# Patient Record
Sex: MALE | URBAN - METROPOLITAN AREA
[De-identification: names, ages, dates, MRNs, and addresses within clinical notes are randomized per-mention and may not be internally consistent; named-entity substitution may affect disease eponyms.]

---

## 2017-01-30 ENCOUNTER — APPOINTMENT (RX ONLY)
Dept: URBAN - METROPOLITAN AREA CLINIC 109 | Facility: CLINIC | Age: 77
Setting detail: DERMATOLOGY
End: 2017-01-30

## 2017-01-30 DIAGNOSIS — L29.89 OTHER PRURITUS: ICD-10-CM

## 2017-01-30 DIAGNOSIS — L20.89 OTHER ATOPIC DERMATITIS: ICD-10-CM

## 2017-01-30 PROBLEM — Z85.828 PERSONAL HISTORY OF OTHER MALIGNANT NEOPLASM OF SKIN: Status: ACTIVE | Noted: 2017-01-30

## 2017-01-30 PROBLEM — I48.91 UNSPECIFIED ATRIAL FIBRILLATION: Status: ACTIVE | Noted: 2017-01-30

## 2017-01-30 PROBLEM — L30.9 DERMATITIS, UNSPECIFIED: Status: ACTIVE | Noted: 2017-01-30

## 2017-01-30 PROBLEM — L29.8 OTHER PRURITUS: Status: ACTIVE | Noted: 2017-01-30

## 2017-01-30 PROBLEM — L70.0 ACNE VULGARIS: Status: ACTIVE | Noted: 2017-01-30

## 2017-01-30 PROBLEM — H91.90 UNSPECIFIED HEARING LOSS, UNSPECIFIED EAR: Status: ACTIVE | Noted: 2017-01-30

## 2017-01-30 PROBLEM — L57.0 ACTINIC KERATOSIS: Status: ACTIVE | Noted: 2017-01-30

## 2017-01-30 PROBLEM — Z85.820 PERSONAL HISTORY OF MALIGNANT MELANOMA OF SKIN: Status: ACTIVE | Noted: 2017-01-30

## 2017-01-30 PROBLEM — L23.7 ALLERGIC CONTACT DERMATITIS DUE TO PLANTS, EXCEPT FOOD: Status: ACTIVE | Noted: 2017-01-30

## 2017-01-30 PROCEDURE — 99203 OFFICE O/P NEW LOW 30 MIN: CPT

## 2017-01-30 PROCEDURE — ? COUNSELING

## 2017-01-30 PROCEDURE — ? PRESCRIPTION

## 2017-01-30 RX ORDER — TRIAMCINOLONE ACETONIDE 0.25 MG/G
CREAM TOPICAL
Qty: 1 | Refills: 2 | Status: ERX | COMMUNITY
Start: 2017-01-30

## 2017-01-30 RX ADMIN — TRIAMCINOLONE ACETONIDE: 0.25 CREAM TOPICAL at 18:01

## 2017-03-15 ENCOUNTER — APPOINTMENT (RX ONLY)
Dept: URBAN - METROPOLITAN AREA CLINIC 109 | Facility: CLINIC | Age: 77
Setting detail: DERMATOLOGY
End: 2017-03-15

## 2017-03-15 DIAGNOSIS — Z85.828 PERSONAL HISTORY OF OTHER MALIGNANT NEOPLASM OF SKIN: ICD-10-CM

## 2017-03-15 DIAGNOSIS — L82.1 OTHER SEBORRHEIC KERATOSIS: ICD-10-CM

## 2017-03-15 DIAGNOSIS — D22 MELANOCYTIC NEVI: ICD-10-CM

## 2017-03-15 DIAGNOSIS — L57.0 ACTINIC KERATOSIS: ICD-10-CM

## 2017-03-15 DIAGNOSIS — Z86.006 PERSONAL HISTORY OF MELANOMA IN-SITU: ICD-10-CM

## 2017-03-15 DIAGNOSIS — D18.0 HEMANGIOMA: ICD-10-CM

## 2017-03-15 DIAGNOSIS — L57.8 OTHER SKIN CHANGES DUE TO CHRONIC EXPOSURE TO NONIONIZING RADIATION: ICD-10-CM

## 2017-03-15 PROBLEM — D22.9 MELANOCYTIC NEVI, UNSPECIFIED: Status: ACTIVE | Noted: 2017-03-15

## 2017-03-15 PROBLEM — D18.01 HEMANGIOMA OF SKIN AND SUBCUTANEOUS TISSUE: Status: ACTIVE | Noted: 2017-03-15

## 2017-03-15 PROBLEM — Z85.820 PERSONAL HISTORY OF MALIGNANT MELANOMA OF SKIN: Status: ACTIVE | Noted: 2017-03-15

## 2017-03-15 PROCEDURE — 17003 DESTRUCT PREMALG LES 2-14: CPT

## 2017-03-15 PROCEDURE — ? COUNSELING

## 2017-03-15 PROCEDURE — 17000 DESTRUCT PREMALG LESION: CPT

## 2017-03-15 PROCEDURE — 99214 OFFICE O/P EST MOD 30 MIN: CPT | Mod: 25

## 2017-03-15 PROCEDURE — ? LIQUID NITROGEN

## 2017-03-15 ASSESSMENT — LOCATION SIMPLE DESCRIPTION DERM
LOCATION SIMPLE: RIGHT FOREHEAD
LOCATION SIMPLE: RIGHT EAR

## 2017-03-15 ASSESSMENT — LOCATION ZONE DERM
LOCATION ZONE: EAR
LOCATION ZONE: FACE

## 2017-03-15 ASSESSMENT — LOCATION DETAILED DESCRIPTION DERM
LOCATION DETAILED: RIGHT INFERIOR HELIX
LOCATION DETAILED: RIGHT SUPERIOR HELIX
LOCATION DETAILED: RIGHT SUPERIOR FOREHEAD

## 2017-03-15 NOTE — PROCEDURE: LIQUID NITROGEN
Duration Of Freeze Thaw-Cycle (Seconds): 0
Render Post-Care Instructions In Note?: no
Consent: The patient's consent was obtained including but not limited to risks of crusting, scabbing, blistering, scarring, darker or lighter pigmentary change, recurrence, incomplete removal and infection.
Post-Care Instructions: I reviewed with the patient in detail post-care instructions. Patient is to wear sunprotection, and avoid picking at any of the treated lesions. Pt may apply Vaseline to crusted or scabbing areas.
Detail Level: Zone

## 2019-03-13 ENCOUNTER — CLINICAL SUPPORT (OUTPATIENT)
Dept: REHABILITATION | Facility: HOSPITAL | Age: 79
End: 2019-03-13
Payer: MEDICARE

## 2019-03-13 DIAGNOSIS — M62.81 MUSCLE WEAKNESS (GENERALIZED): ICD-10-CM

## 2019-03-13 PROCEDURE — 97161 PT EVAL LOW COMPLEX 20 MIN: CPT

## 2019-03-13 PROCEDURE — 98960 EDU&TRN PT SELF-MGMT NQHP 1: CPT

## 2019-03-13 PROCEDURE — G8981 BODY POS CURRENT STATUS: HCPCS | Mod: CJ

## 2019-03-13 PROCEDURE — G8982 BODY POS GOAL STATUS: HCPCS | Mod: CI

## 2019-03-13 NOTE — PLAN OF CARE
Physical Therapy Evaluation / Plan of Care    Name: Joao Ardon  Clinic Number: [id number]    Diagnosis:   Encounter Diagnosis   Name Primary?    Muscle weakness (generalized)      Physician: Florentin Gutierrez PA  Treatment Orders: PT Eval and Treat    No past medical history on file.  No current outpatient medications on file.     No current facility-administered medications for this visit.      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 male that presents to [facility] secondary to weakness. Joao states he has weakness and fatigue affecting he ADL's and functional mobility. He reports this all started ~1 year ago which ultimately required CABG x3. He is now finished with outpatient cardiac rehab and was seen for PT eval in [place] but was unable to start visits due to traveling to [place]. He currently has no c/o back pain but wants to attend PT for strengthening. Patient has no reported falls in the past 6 months.    Imaging: no available imaging studies available in Epic    Pain: current 0/10, worst 5/10, best 0/10, Aching and Dull, intermittent  Radicular symptoms: no  Aggravating factors: standing and walking  Easing factors: rest, change in position    Pts goals: to be able  "to hit a golf ball again    No cultural, environmental, or spiritual barriers identified to treatment or learning.  Objective   Observation: patient is a well developed, well nourished male in NAD.  Posture:  Forward head,rounded shoulder posture, loss of lumbar lordosis, mild bilateral hip and knee flexion noted in standing  Lumbar Range of Motion:    Degrees Pain   Flexion 40   No change         Extension 15   No change        Left Side Bending 20 No change        Right Side Bending 25 No change        Left rotation   60 No change        Right Rotation   50  No change      AROM L knee -8 degrees ext, 125 degrees flex, R -5 degrees ext, 125 degrees flex  Lower Extremity Strength  Right LE  Left LE    Knee extension: 5/5 Knee extension: 5/5   Knee flexion: 5/5 Knee flexion: 5/5   Hip flexion: 4+/5 Hip flexion: 4+/5   Hip extension:  4/5 Hip extension: 4/5   Hip abduction: 4+/5 Hip abduction: 4+/5   Hip adduction: 5/5 Hip adduction 5/5   Ankle dorsiflexion: 5/5 Ankle dorsiflexion: 5/5   Ankle plantarflexion: 5/5 Ankle plantarflexion: 5/5   Special Tests:  - TU seconds  - 30 second chair rise: 13  - Toe Taps on 6" step without UE support x 8 reps: 8.68 seconds  Neuro Dynamic Testing:    Sciatic nerve:      SLR: R = neg     L = neg  Joint Mobility: decreased segmental mobility in lumbar spine  Palpation: no significant ttp noted  Sensation: gross sensation intact  Flexibility:     90/90 SLR = R moderate restriction, L moderate restriction   Levon's test: R moderate restriction, L moderate restriction              Myles test: R minimal restriction, L minimal restriction  Edema:  Patient has +1 pitting edema in bilateral lower legs and ankles, L > R  Functional Limitations Reports - G Codes  Mod Oswestry 16% impairment  LEFS: 21.25% LE impairment    PT Evaluation Completed: Yes  Discussed Plan of Care with patient: Yes  TREATMENT:    Home Exercises and Patient Education Provided  Education provided re: postural " care/correction, frequent changes in position throughout the day, resting positions to decrease pain  - importance of HEP in carry over between treatment sessions and for best rehab outcome  - educated on condition and expectations for therapy  - progress towards goals   - role of therapy in multi - disciplinary team, goals for therapy  No spiritual or educational barriers to learning provided    Home exercises:  Pt will be provided HEP during course of treatment with progressions as appropriate. Pt was advised to perform these exercises free of pain, and to stop performing them if pain occurs.   Joao demonstrated good  understanding of the education provided.     Assessment   Joao is an active 79 year old male referred to outpatient physical therapy and presents to PT with weakness and low back pain . Patient demonstrates limitations as described in the problem list. Pt will benefit from physcial therapy services in order to maximize pain free and/or functional use of trunk and bilateral LE's. The following goals were discussed with the patient and patient is in agreement with them as to be addressed in the treatment plan.   Pt prognosis is Good.   Pt will benefit from skilled outpatient Physical Therapy to address the deficits stated above and in the chart below, provide pt/family education, and to maximize pt's level of independence.     Anticipated barriers to physical therapy: none identified    Medical necessity is demonstrated by the following IMPAIRMENTS/PROBLEM LIST:  weakness, impaired endurance, impaired functional mobility and impaired balance    GOALS:   Long Term Goals: 6 weeks    1. Decrease pain to < / = 1/10 with activity to allow for independent ADL's and resumption of recreational activities.  2. Improve strength in core and LE muscles to 5/5 for improved lumbopelvic stability.  3. Improve lumbar and bilateral hip/knee ROM to to demonstrate improved posture and flexibility.   4. Improve score on  LEFS to < / = 10% impairment / Mod Oswestry to < / = 4% impairment to demonstrate improved function and decrease disability.  5. Increase walking distance and/or duration to 1 mile without pain.   6. Increase standing duration to 25 minutes without pain.   7. Perform sit to stand transfers without increased pain or limitation.  8. Pt to demonstrate independence with home exercise program to maintain gains made in therapy.      Plan   Pt will be treated by physical therapy 1-2 times a week for 6 weeks for Pt Education, HEP, therapeutic exercises, neuromuscular re-education, joint mobilizations, modalities prn to achieve established goals. Joao may at times be seen by a PTA as part of the Rehab Team.   Cont PT for 6 weeks.     Maxi Hernández, PT    I certify the need for these services furnished under this plan of treatment and while under my care.______________________________ Physician/Referring Practitioner  Date of Signature

## 2019-03-18 ENCOUNTER — CLINICAL SUPPORT (OUTPATIENT)
Dept: REHABILITATION | Facility: HOSPITAL | Age: 79
End: 2019-03-18
Payer: MEDICARE

## 2019-03-18 DIAGNOSIS — M62.81 MUSCLE WEAKNESS: Primary | ICD-10-CM

## 2019-03-18 PROCEDURE — 97110 THERAPEUTIC EXERCISES: CPT

## 2019-03-18 NOTE — PROGRESS NOTES
"                            Physical Therapy Daily Treatment Note   Name: Ag Ardon 1940  MRN: [id number]    Visit Date: 3/18/2019  Visit #: 2 / 12  Authorization period Expiration: 12/31/19    Plan of Care Expiration: 06/08/19  Precautions: standard    Time In: 9:00  Time Out: 10:00  Total 1:1 Treatment Time: 45 min    Treatment Diagnosis: Low back pain  Physician: Florentin Gutierrez PA    Subjective   Pt reports: He fasted over the weekend and lost 7 pounds. He has no reported low back pain today.     Pain Scale:  0/10 on VAS currently, 0/10 on VAS post treatment  Pain Location: no c/o pain today    Objective   AG received therapeutic exercises to develop strength, endurance, ROM, flexibility, posture and core stabilization for 45 minutes including:  Nustep Lv 1 x 15 minutes  Quad sets in sl flex with 5" H x 10  Quad sets with 5" H x 10  SLR x 10  Pelvic tilt x 10  HS stretch with strap with 20" H x 5  SKC x 5  Bridging with ball x 10  Supine clams with RTB x 10  Standing B heel raises x 15  Standing hip abd x 15  Alt toe taps on 6" step x 15    Home Exercises and Education Provided     Home Exercises and Patient Education Provided  Education provided re: postural care/correction, frequent changes in position throughout the day, resting positions to decrease pain  - importance of HEP in carry over between treatment sessions and for best rehab outcome  - educated on condition and expectations for therapy  - progress towards goals   - role of therapy in multi - disciplinary team, goals for therapy  No spiritual or educational barriers to learning provided     Home exercises:  Pt will be provided HEP during course of treatment with progressions as appropriate. Pt was advised to perform these exercises free of pain, and to stop performing them if pain occurs.   Ag demonstrated good  understanding of the education provided.     Assessment   AG completed above exercise without complication or increase in " pain reported. Verbal cueing required for postural correction and exercise technique. Will progress with strengthening and flexibility program for spine and LE's as well as balance and gait training as tolerated.    Pt prognosis is Good. Pt will continue to benefit from skilled outpatient physical therapy to address the deficits listed in the problem list chart on initial evaluation, provide pt/family education and to maximize pt's level of independence in the home and community environment.     Medical necessity is demonstrated by the impairments and functional limitations listed on the Initial Evaluation.     Anticipated barriers to physical therapy: none identified  Pt's spiritual, cultural and educational needs considered and pt agreeable to plan of care and goals.    Goals   Long Term Goals: 6 weeks     1. Decrease pain to < / = 1/10 with activity to allow for independent ADL's and resumption of recreational activities.  2. Improve strength in core and LE muscles to 5/5 for improved lumbopelvic stability.  3. Improve lumbar and bilateral hip/knee ROM to to demonstrate improved posture and flexibility.   4. Improve score on LEFS to < / = 10% impairment / Mod Oswestry to < / = 4% impairment to demonstrate improved function and decrease disability.  5. Increase walking distance and/or duration to 1 mile without pain.   6. Increase standing duration to 25 minutes without pain.   7. Perform sit to stand transfers without increased pain or limitation.  8. Pt to demonstrate independence with home exercise program to maintain gains made in therapy.      Plan   Continue with established Plan of Care towards Physical Therapy goals.   Discussed Plan of Care with patient: Yes    Maxi Hernández, PT

## 2019-03-19 PROBLEM — M62.81 MUSCLE WEAKNESS (GENERALIZED): Status: ACTIVE | Noted: 2019-03-19

## 2019-03-20 ENCOUNTER — CLINICAL SUPPORT (OUTPATIENT)
Dept: REHABILITATION | Facility: HOSPITAL | Age: 79
End: 2019-03-20
Payer: MEDICARE

## 2019-03-20 DIAGNOSIS — M62.81 MUSCLE WEAKNESS (GENERALIZED): Primary | ICD-10-CM

## 2019-03-20 PROCEDURE — 97110 THERAPEUTIC EXERCISES: CPT

## 2019-03-20 NOTE — PROGRESS NOTES
"                            Physical Therapy Daily Treatment Note   Name: Joao Ardon 1940  MRN: [id number]    Visit Date: 3/20/2019  Visit #: 3 / 12  Authorization period Expiration: 12/31/19    Plan of Care Expiration: 06/08/19  Precautions: standard    Time In: 9:00  Time Out: 10:00  Total 1:1 Treatment Time: 50 min    Treatment Diagnosis: Low back pain  Physician: Florentin Gutierrez PA    Subjective   Pt reports: He is doing well today, no specific c/o pain.     Pain Scale:  0/10 on VAS currently, 0/10 on VAS post treatment  Pain Location: no c/o pain today    Objective   JACKSON received therapeutic exercises to develop strength, endurance, ROM, flexibility, posture and core stabilization for 50 minutes including:  Nustep Lv 3 x 15 minutes  Quad sets in sl flex with 5" H x 10  Quad sets with 5" H x 10  SLR x 10  Pelvic tilt x 10  Supine LTR x 10  DKC with PB x 10  HS stretch with strap with 20" H x 5  SKC x 5  Bridging with ball x 10  Supine clams with RTB x 10  Standing B heel raises x 15  Standing hip abd x 15 DNP  Alt toe taps on 6" step x 15  Standing bilateral rows with RTB x 10  Standing bilateral shoulder ext with RTB x 10  SLS with 1 UE assist as needed x 5 each leg (CGA for safety)    Home Exercises and Education Provided   Home Exercises and Patient Education Provided  Education provided re: postural care/correction, frequent changes in position throughout the day, resting positions to decrease pain  - importance of HEP in carry over between treatment sessions and for best rehab outcome  - educated on condition and expectations for therapy  - progress towards goals   - role of therapy in multi - disciplinary team, goals for therapy  No spiritual or educational barriers to learning provided     Home exercises: issued RTB   Pt will be provided HEP during course of treatment with progressions as appropriate. Pt was advised to perform these exercises free of pain, and to stop performing them if pain " kan.   Joao demonstrated good  understanding of the education provided.      Assessment   JACKSON completed above exercise without complication or increase in pain reported. Patient demonstrating antalgic gait pattern with increased base of support, sl flexion noted in trunk/hips/knees with compensation via bilateral shoulder extension and scapular retraction, and increase ER at hips. Will progress with strengthening and flexibility program for spine and LE's as well as balance and gait training as tolerated. Jackson and very motivated and is progressing well towards his goals with no updates to goals at this time.     Pt prognosis is Good. Pt will continue to benefit from skilled outpatient physical therapy to address the deficits listed in the problem list chart on initial evaluation, provide pt/family education and to maximize pt's level of independence in the home and community environment.     Medical necessity is demonstrated by the impairments and functional limitations listed on the Initial Evaluation.     Anticipated barriers to physical therapy: none identified  Pt's spiritual, cultural and educational needs considered and pt agreeable to plan of care and goals.    Goals   Long Term Goals: 6 weeks     1. Decrease pain to < / = 1/10 with activity to allow for independent ADL's and resumption of recreational activities.  2. Improve strength in core and LE muscles to 5/5 for improved lumbopelvic stability.  3. Improve lumbar and bilateral hip/knee ROM to to demonstrate improved posture and flexibility.   4. Improve score on LEFS to < / = 10% impairment / Mod Oswestry to < / = 4% impairment to demonstrate improved function and decrease disability.  5. Increase walking distance and/or duration to 1 mile without pain.   6. Increase standing duration to 25 minutes without pain.   7. Perform sit to stand transfers without increased pain or limitation.  8. Pt to demonstrate independence with home exercise program to  maintain gains made in therapy.      Plan   Continue with established Plan of Care towards Physical Therapy goals.   Discussed Plan of Care with patient: Yes    Maxi Hernández, PT

## 2019-03-25 ENCOUNTER — CLINICAL SUPPORT (OUTPATIENT)
Dept: REHABILITATION | Facility: HOSPITAL | Age: 79
End: 2019-03-25
Payer: MEDICARE

## 2019-03-25 DIAGNOSIS — M62.81 MUSCLE WEAKNESS (GENERALIZED): ICD-10-CM

## 2019-03-25 PROCEDURE — 97110 THERAPEUTIC EXERCISES: CPT

## 2019-03-25 PROCEDURE — 97530 THERAPEUTIC ACTIVITIES: CPT

## 2019-03-25 NOTE — PROGRESS NOTES
"                            Physical Therapy Daily Treatment Note   Name: Joao Ardon 1940  MRN: [id number]    Visit Date: 3/25/2019  Visit #: 4 / 12  Authorization period Expiration: 12/31/19    Plan of Care Expiration: 06/08/19  Precautions: standard    Time In: 9:00  Time Out: 10:00  Total 1:1 Treatment Time: 60 min    Treatment Diagnosis: Low back pain  Physician: Florentin Gutierrez PA    Subjective   Pt reports: no pain at the moment, when I play golf I can tell my back is weak and my core is weak. No complaints just want to get stronger.      Pain Scale:  0/10 on VAS currently, 0/10 on VAS post treatment  Pain Location: no c/o pain today    Objective   JACKSON received therapeutic exercises to develop strength, endurance, ROM, flexibility, posture and core stabilization for 50 minutes including:  Nustep Lv 3 x 15 minutes  Quad sets in sl flex with 5" H x 10  Quad sets with 5" H x 10  SLR 2 x 10 1#  Pelvic tilt x 10  Supine LTR x 15  DKC with PB x 15  HS stretch with strap with 20" H x 5  Bridging with ball x 15  Supine clams with RTB 2 x 10  Standing B heel raises x 20  Standing hip abd x 15 DNP  Alt toe taps on 6" step 2 x 10  Standing bilateral rows with BTB 2 x 10  Standing bilateral shoulder ext with BTB 2 x 10  Seated on ball resisted trunk rotation: 2 plates x 10  Mat squats: 2 x 10  LAQ: 3# 2 x 10  Step ups: fwd/lat x 10 ea 6" step  Prone hip ext: 2 x 10 ea  hooklying crunches: x15    Home Exercises and Education Provided   Home Exercises and Patient Education Provided  Education provided re: postural care/correction, frequent changes in position throughout the day, resting positions to decrease pain  - importance of HEP in carry over between treatment sessions and for best rehab outcome  - educated on condition and expectations for therapy  - progress towards goals   - role of therapy in multi - disciplinary team, goals for therapy  No spiritual or educational barriers to learning provided     Home " exercises: issued RTB   Pt will be provided HEP during course of treatment with progressions as appropriate. Pt was advised to perform these exercises free of pain, and to stop performing them if pain occurs.   Joao demonstrated good  understanding of the education provided.      Assessment   JACKSON completed above exercise without complication or increase in pain reported and able to progress with core/LE/lumbar strengthening and stabilization exercise. Pt reports moderate fatigue post treatment however demonstrates increased activity tolerance. Continue to progress per pt tolerance, no pain upon departure.     Pt prognosis is Good. Pt will continue to benefit from skilled outpatient physical therapy to address the deficits listed in the problem list chart on initial evaluation, provide pt/family education and to maximize pt's level of independence in the home and community environment.     Medical necessity is demonstrated by the impairments and functional limitations listed on the Initial Evaluation.     Anticipated barriers to physical therapy: none identified  Pt's spiritual, cultural and educational needs considered and pt agreeable to plan of care and goals.    Goals   Long Term Goals: 6 weeks     1. Decrease pain to < / = 1/10 with activity to allow for independent ADL's and resumption of recreational activities.  2. Improve strength in core and LE muscles to 5/5 for improved lumbopelvic stability.  3. Improve lumbar and bilateral hip/knee ROM to to demonstrate improved posture and flexibility.   4. Improve score on LEFS to < / = 10% impairment / Mod Oswestry to < / = 4% impairment to demonstrate improved function and decrease disability.  5. Increase walking distance and/or duration to 1 mile without pain.   6. Increase standing duration to 25 minutes without pain.   7. Perform sit to stand transfers without increased pain or limitation.  8. Pt to demonstrate independence with home exercise program to  maintain gains made in therapy.      Plan   Continue with established Plan of Care towards Physical Therapy goals.   Discussed Plan of Care with patient: Yes    Stna Kohli, PTA

## 2019-03-27 ENCOUNTER — CLINICAL SUPPORT (OUTPATIENT)
Dept: REHABILITATION | Facility: HOSPITAL | Age: 79
End: 2019-03-27
Payer: MEDICARE

## 2019-03-27 DIAGNOSIS — M62.81 MUSCLE WEAKNESS (GENERALIZED): ICD-10-CM

## 2019-03-27 PROCEDURE — 97530 THERAPEUTIC ACTIVITIES: CPT

## 2019-03-27 PROCEDURE — 97110 THERAPEUTIC EXERCISES: CPT

## 2019-03-27 NOTE — PROGRESS NOTES
"                            Physical Therapy Daily Treatment Note   Name: Joao Ardon 1940  MRN: [id number]    Visit Date: 3/27/2019  Visit #: 5 / 12  Authorization period Expiration: 12/31/19    Plan of Care Expiration: 06/08/19  Precautions: standard    Time In: 9:00  Time Out: 10:00  Total 1:1 Treatment Time: 60 min    Treatment Diagnosis: Low back pain  Physician: Florentin Gutierrez PA    Subjective   Pt reports: been pain free, i'm doing fine, last session felt good.      Pain Scale:  0/10 on VAS currently, 0/10 on VAS post treatment  Pain Location: no c/o pain today    Objective   JACKSON received therapeutic exercises to develop strength, endurance, ROM, flexibility, posture and core stabilization for 50 minutes including:  Nustep Lv 3 x 15 minutes  Quad sets in sl flex with 5" H x 10  Quad sets with 5" H x 10  SLR 2 x 10 1#  Pelvic tilt x 10  Supine LTR 2 x 10  DKC with PB 2 x 10  HS stretch with strap with 20" H x 5  Bridging with ball 2 x 10  Supine clams with RTB 2 x 10  Standing B heel raises x 20  Standing hip abd x 15 DNP  Alt toe taps on 6" step 2 x 10  Standing bilateral rows with BTB 2 x 10  Standing bilateral shoulder ext with BTB 2 x 10  Seated on chair resisted trunk rotation: 2 plates 3 x 10  Mat squats: 2 x 10  LAQ: 3# 2 x 10  Step ups: fwd/lat x 10 ea 6" step  Prone hip ext: 2 x 10 ea  hooklying crunches: 2x15    Home Exercises and Education Provided   Home Exercises and Patient Education Provided  Education provided re: postural care/correction, frequent changes in position throughout the day, resting positions to decrease pain  - importance of HEP in carry over between treatment sessions and for best rehab outcome  - educated on condition and expectations for therapy  - progress towards goals   - role of therapy in multi - disciplinary team, goals for therapy  No spiritual or educational barriers to learning provided     Home exercises: issued RTB   Pt will be provided HEP during course of " treatment with progressions as appropriate. Pt was advised to perform these exercises free of pain, and to stop performing them if pain occurs.   Joao demonstrated good  understanding of the education provided.      Assessment   JACKSON completed above exercise without complication or increase in pain reported and able to progress with core/LE/lumbar strengthening and stabilization exercise by increasing reps with therex. Pt reports moderate fatigue post treatment however demonstrates increased activity tolerance. Continue to progress per pt tolerance, no pain upon departure.     Pt prognosis is Good. Pt will continue to benefit from skilled outpatient physical therapy to address the deficits listed in the problem list chart on initial evaluation, provide pt/family education and to maximize pt's level of independence in the home and community environment.     Medical necessity is demonstrated by the impairments and functional limitations listed on the Initial Evaluation.     Anticipated barriers to physical therapy: none identified  Pt's spiritual, cultural and educational needs considered and pt agreeable to plan of care and goals.    Goals   Long Term Goals: 6 weeks     1. Decrease pain to < / = 1/10 with activity to allow for independent ADL's and resumption of recreational activities.  2. Improve strength in core and LE muscles to 5/5 for improved lumbopelvic stability.  3. Improve lumbar and bilateral hip/knee ROM to to demonstrate improved posture and flexibility.   4. Improve score on LEFS to < / = 10% impairment / Mod Oswestry to < / = 4% impairment to demonstrate improved function and decrease disability.  5. Increase walking distance and/or duration to 1 mile without pain.   6. Increase standing duration to 25 minutes without pain.   7. Perform sit to stand transfers without increased pain or limitation.  8. Pt to demonstrate independence with home exercise program to maintain gains made in therapy.      Plan    Continue with established Plan of Care towards Physical Therapy goals.   Discussed Plan of Care with patient: Yes    Stan Kohli, PTA

## 2019-04-01 ENCOUNTER — CLINICAL SUPPORT (OUTPATIENT)
Dept: REHABILITATION | Facility: HOSPITAL | Age: 79
End: 2019-04-01
Payer: MEDICARE

## 2019-04-01 DIAGNOSIS — M62.81 MUSCLE WEAKNESS (GENERALIZED): ICD-10-CM

## 2019-04-01 PROCEDURE — 97110 THERAPEUTIC EXERCISES: CPT

## 2019-04-04 ENCOUNTER — LAB VISIT (OUTPATIENT)
Dept: LAB | Facility: HOSPITAL | Age: 79
End: 2019-04-04
Attending: INTERNAL MEDICINE
Payer: MEDICARE

## 2019-04-04 ENCOUNTER — CLINICAL SUPPORT (OUTPATIENT)
Dept: REHABILITATION | Facility: HOSPITAL | Age: 79
End: 2019-04-04
Payer: MEDICARE

## 2019-04-04 DIAGNOSIS — I48.20 CHRONIC ATRIAL FIBRILLATION: Primary | ICD-10-CM

## 2019-04-04 DIAGNOSIS — M62.81 MUSCLE WEAKNESS (GENERALIZED): ICD-10-CM

## 2019-04-04 LAB
INR PPP: 3.1 (ref 0.8–1.2)
PROTHROMBIN TIME: 34.6 SEC (ref 9–12.5)

## 2019-04-04 PROCEDURE — 97530 THERAPEUTIC ACTIVITIES: CPT

## 2019-04-04 PROCEDURE — 36415 COLL VENOUS BLD VENIPUNCTURE: CPT

## 2019-04-04 PROCEDURE — 97110 THERAPEUTIC EXERCISES: CPT

## 2019-04-04 PROCEDURE — 85610 PROTHROMBIN TIME: CPT

## 2019-04-04 PROCEDURE — 97140 MANUAL THERAPY 1/> REGIONS: CPT

## 2019-04-04 NOTE — PROGRESS NOTES
"                            Physical Therapy Daily Treatment Note   Name: Joao Ardon 1940  MRN: [id number]    Visit Date: 4/4/2019  Visit #: 7 / 12  Authorization period Expiration: 12/31/19    Plan of Care Expiration: 06/08/19  Precautions: standard    Time In: 9:00  Time Out: 10:10  Total 1:1 Treatment Time: 60 min    Treatment Diagnosis: Low back pain  Physician: Florentin Gutierrez PA    Subjective   Pt reports: no pain. Continued stiffness in low back.      Pain Scale:  0/10 on VAS currently, 0/10 on VAS post treatment  Pain Location: no c/o pain today    Objective   JACKSON received therapeutic exercises (40 min), therapeutic activities (10 min)  to develop strength, endurance, ROM, flexibility, posture and core stabilization including:  Nustep Lv 3 x 15 minutes  Pelvic tilt x 5 March with abdominal bracing x 10 ea  Supine LTR with SB 2 x 10  HS stretch in long sitting 1 min ea  Bridging with ball 2 x 10  Supine clams with GTB 2 x 10  Standing bilateral rows with BTB 2 x 10  Standing bilateral shoulder ext with BTB 2 x 10  Seated on chair resisted trunk rotation: 3 plates 2x15  Mat squats: 2 x 10  Step ups: fwd/lat x 15 ea 6" step  Prone hip ext: 2 x 10 ea  hooklying crunches: 2x15  Chop and lift in standing: x15 ea/ Blue TB    Jackson received the following manual therapy techniques for 10 minutes including:  Lumbar rotational mobs in s/l bilaterally gr II       Home Exercises and Education Provided   Home Exercises and Patient Education Provided  Education provided re: postural care/correction, frequent changes in position throughout the day, resting positions to decrease pain  - importance of HEP in carry over between treatment sessions and for best rehab outcome  - educated on condition and expectations for therapy  - progress towards goals   - role of therapy in multi - disciplinary team, goals for therapy  No spiritual or educational barriers to learning provided     Home exercises:   Pt will be provided " HEP during course of treatment with progressions as appropriate. Pt was advised to perform these exercises free of pain, and to stop performing them if pain occurs.   Joao demonstrated good  understanding of the education provided.      Assessment   JACKSON completed above exercise without complication or increase in pain reported and able to progress with core/LE/lumbar strengthening and stabilization exercise by increasing reps with therex. Patient with severe restrictions in hamstring, quad, rectus femoris length bilaterally affecting posture. Pt quick to get winded due to low CV endurance, requiring occasional rest breaks. Pt demonstrating core strength deficits with difficulty activating and maintaining lower abdominal contraction.       Pt prognosis is Good. Pt will continue to benefit from skilled outpatient physical therapy to address the deficits listed in the problem list chart on initial evaluation, provide pt/family education and to maximize pt's level of independence in the home and community environment.     Medical necessity is demonstrated by the impairments and functional limitations listed on the Initial Evaluation.     Anticipated barriers to physical therapy: none identified  Pt's spiritual, cultural and educational needs considered and pt agreeable to plan of care and goals.    Goals   Long Term Goals: 6 weeks     1. Decrease pain to < / = 1/10 with activity to allow for independent ADL's and resumption of recreational activities.  2. Improve strength in core and LE muscles to 5/5 for improved lumbopelvic stability.  3. Improve lumbar and bilateral hip/knee ROM to to demonstrate improved posture and flexibility.   4. Improve score on LEFS to < / = 10% impairment / Mod Oswestry to < / = 4% impairment to demonstrate improved function and decrease disability.  5. Increase walking distance and/or duration to 1 mile without pain.   6. Increase standing duration to 25 minutes without pain.   7. Perform  sit to stand transfers without increased pain or limitation.  8. Pt to demonstrate independence with home exercise program to maintain gains made in therapy.      Plan   Continue with established Plan of Care towards Physical Therapy goals.   Discussed Plan of Care with patient: Yes    Brenda Patel, PT

## 2019-04-04 NOTE — PROGRESS NOTES
Supervisory visit with Stan Kohli PTA.  May proceed with goals and treatments as outlined in POC.

## 2019-04-08 ENCOUNTER — CLINICAL SUPPORT (OUTPATIENT)
Dept: REHABILITATION | Facility: HOSPITAL | Age: 79
End: 2019-04-08
Payer: MEDICARE

## 2019-04-08 DIAGNOSIS — M62.81 MUSCLE WEAKNESS (GENERALIZED): ICD-10-CM

## 2019-04-08 PROCEDURE — 97110 THERAPEUTIC EXERCISES: CPT

## 2019-04-08 NOTE — PROGRESS NOTES
"                            Physical Therapy Daily Treatment Note   Name: Joao Ardon 1940  MRN: [id number]    Visit Date: 4/8/2019  Visit #: 8 / 12  Authorization period Expiration: 12/31/19    Plan of Care Expiration: 06/08/19  Precautions: standard    Time In: 9:00  Time Out: 10:10  Total 1:1 Treatment Time: 60 min    Treatment Diagnosis: Low back pain  Physician: Florentin Gutierrez PA    Subjective   Pt reports: no pain, just tired because it's Monday.      Pain Scale:  0/10 on VAS currently, 0/10 on VAS post treatment  Pain Location: no c/o pain today    Objective   JACKSON received therapeutic exercises (40 min), therapeutic activities (10 min)  to develop strength, endurance, ROM, flexibility, posture and core stabilization including:  Nustep Lv 3 x 15 minutes  Pelvic tilt x 5 March with abdominal bracing x 10 ea  Supine LTR with SB 2 x 10  HS stretch in long sitting 1 min ea  Bridging with ball 2 x 10  Supine clams with GTB 2 x 10  Standing bilateral rows with BTB 2 x 10  Standing bilateral shoulder ext with BTB 2 x 10  Seated on chair resisted trunk rotation: 3 plates 2x15  Mat squats: 2 x 15  Step ups: fwd/lat x 15 ea 6" step  Prone hip ext: 2 x 10 ea  hooklying crunches: 2x15  Chop and lift in standing: x15 ea/ Blue TB DNP    Jackson received the following manual therapy techniques for 10 minutes including:  Lumbar rotational mobs in s/l bilaterally gr II DNP      Home Exercises and Education Provided   Home Exercises and Patient Education Provided  Education provided re: postural care/correction, frequent changes in position throughout the day, resting positions to decrease pain  - importance of HEP in carry over between treatment sessions and for best rehab outcome  - educated on condition and expectations for therapy  - progress towards goals   - role of therapy in multi - disciplinary team, goals for therapy  No spiritual or educational barriers to learning provided     Home exercises:   Pt will be " provided HEP during course of treatment with progressions as appropriate. Pt was advised to perform these exercises free of pain, and to stop performing them if pain occurs.   Joao demonstrated good  understanding of the education provided.      Assessment   JACKSON was able to complete core strengthening/stabilization/mobility therex today, continues to present with hamstring and hip flexor tightness. No complications or pain reported with treatment session, continue to progress as tolerated. Pain free upon departure.       Pt prognosis is Good. Pt will continue to benefit from skilled outpatient physical therapy to address the deficits listed in the problem list chart on initial evaluation, provide pt/family education and to maximize pt's level of independence in the home and community environment.     Medical necessity is demonstrated by the impairments and functional limitations listed on the Initial Evaluation.     Anticipated barriers to physical therapy: none identified  Pt's spiritual, cultural and educational needs considered and pt agreeable to plan of care and goals.    Goals   Long Term Goals: 6 weeks     1. Decrease pain to < / = 1/10 with activity to allow for independent ADL's and resumption of recreational activities.  2. Improve strength in core and LE muscles to 5/5 for improved lumbopelvic stability.  3. Improve lumbar and bilateral hip/knee ROM to to demonstrate improved posture and flexibility.   4. Improve score on LEFS to < / = 10% impairment / Mod Oswestry to < / = 4% impairment to demonstrate improved function and decrease disability.  5. Increase walking distance and/or duration to 1 mile without pain.   6. Increase standing duration to 25 minutes without pain.   7. Perform sit to stand transfers without increased pain or limitation.  8. Pt to demonstrate independence with home exercise program to maintain gains made in therapy.      Plan   Continue with established Plan of Care towards  Physical Therapy goals.   Discussed Plan of Care with patient: Yes    Stan Kohli, PTA

## 2019-04-12 ENCOUNTER — LAB VISIT (OUTPATIENT)
Dept: LAB | Facility: HOSPITAL | Age: 79
End: 2019-04-12
Attending: INTERNAL MEDICINE
Payer: MEDICARE

## 2019-04-12 DIAGNOSIS — I48.20 CHRONIC ATRIAL FIBRILLATION: Primary | ICD-10-CM

## 2019-04-12 LAB
INR PPP: 2.1 (ref 0.8–1.2)
PROTHROMBIN TIME: 23.2 SEC (ref 9–12.5)

## 2019-04-12 PROCEDURE — 36415 COLL VENOUS BLD VENIPUNCTURE: CPT

## 2019-04-12 PROCEDURE — 85610 PROTHROMBIN TIME: CPT

## 2019-04-15 ENCOUNTER — CLINICAL SUPPORT (OUTPATIENT)
Dept: REHABILITATION | Facility: HOSPITAL | Age: 79
End: 2019-04-15
Payer: MEDICARE

## 2019-04-15 DIAGNOSIS — M62.81 MUSCLE WEAKNESS (GENERALIZED): ICD-10-CM

## 2019-04-15 PROCEDURE — 97110 THERAPEUTIC EXERCISES: CPT

## 2019-04-15 NOTE — PROGRESS NOTES
"                            Physical Therapy Daily Treatment Note   Name: Joao Ardon 1940  MRN: [id number]    Visit Date: 4/15/2019  Visit #: 9 / 12  Authorization period Expiration: 12/31/19    Plan of Care Expiration: 06/08/19  Precautions: standard    Time In: 9:00  Time Out: 10:10  Total 1:1 Treatment Time: 60 min    Treatment Diagnosis: Low back pain  Physician: Florentin Gutierrez PA    Subjective   Pt reports: a little stove up but no pain, just trying to get moving.      Pain Scale:  0/10 on VAS currently, 0/10 on VAS post treatment  Pain Location: no c/o pain today    Objective   JACKSON received therapeutic exercises (40 min), therapeutic activities (10 min)  to develop strength, endurance, ROM, flexibility, posture and core stabilization including:  Nustep Lv 3 x 15 minutes  Pelvic tilt x 5 March with abdominal bracing x 10 ea  Supine LTR with SB 2 x 10  HS stretch in long sitting 1 min ea  Bridging with ball 2 x 10   Supine clams with GTB 2 x 10  Standing bilateral rows with BTB 2 x 10  Standing bilateral shoulder ext with BTB 2 x 10  Seated on chair resisted trunk rotation: 3 plates 2x15  Mat squats: 2 x 15  Step ups: fwd/lat x 15 ea 6" step  Prone hip ext: 2 x 10 ea  hooklying crunches: 2x15  Chop and lift in standing: x15 ea/ Blue TB DNP    Jackson received the following manual therapy techniques for 10 minutes including:  Lumbar rotational mobs in s/l bilaterally gr II DNP      Home Exercises and Education Provided   Home Exercises and Patient Education Provided  Education provided re: postural care/correction, frequent changes in position throughout the day, resting positions to decrease pain  - importance of HEP in carry over between treatment sessions and for best rehab outcome  - educated on condition and expectations for therapy  - progress towards goals   - role of therapy in multi - disciplinary team, goals for therapy  No spiritual or educational barriers to learning provided     Home exercises: "   Pt will be provided HEP during course of treatment with progressions as appropriate. Pt was advised to perform these exercises free of pain, and to stop performing them if pain occurs.   Joao demonstrated good  understanding of the education provided.      Assessment   JACKSON was able to complete core strengthening/stabilization/mobility therex today, continues to present with hamstring and hip flexor tightness. No complications or pain reported with treatment session, continue to progress as tolerated. Pain free upon departure.       Pt prognosis is Good. Pt will continue to benefit from skilled outpatient physical therapy to address the deficits listed in the problem list chart on initial evaluation, provide pt/family education and to maximize pt's level of independence in the home and community environment.     Medical necessity is demonstrated by the impairments and functional limitations listed on the Initial Evaluation.     Anticipated barriers to physical therapy: none identified  Pt's spiritual, cultural and educational needs considered and pt agreeable to plan of care and goals.    Goals   Long Term Goals: 6 weeks     1. Decrease pain to < / = 1/10 with activity to allow for independent ADL's and resumption of recreational activities.  2. Improve strength in core and LE muscles to 5/5 for improved lumbopelvic stability.  3. Improve lumbar and bilateral hip/knee ROM to to demonstrate improved posture and flexibility.   4. Improve score on LEFS to < / = 10% impairment / Mod Oswestry to < / = 4% impairment to demonstrate improved function and decrease disability.  5. Increase walking distance and/or duration to 1 mile without pain.   6. Increase standing duration to 25 minutes without pain.   7. Perform sit to stand transfers without increased pain or limitation.  8. Pt to demonstrate independence with home exercise program to maintain gains made in therapy.      Plan   Continue with established Plan of Care  towards Physical Therapy goals.   Discussed Plan of Care with patient: Yes    Stan Kohli, PTA

## 2019-04-17 ENCOUNTER — CLINICAL SUPPORT (OUTPATIENT)
Dept: REHABILITATION | Facility: HOSPITAL | Age: 79
End: 2019-04-17
Payer: MEDICARE

## 2019-04-17 DIAGNOSIS — M62.81 MUSCLE WEAKNESS (GENERALIZED): ICD-10-CM

## 2019-04-17 PROCEDURE — 97110 THERAPEUTIC EXERCISES: CPT

## 2019-04-17 NOTE — PROGRESS NOTES
"                            Physical Therapy Daily Treatment Note   Name: Joao Ardon 1940  MRN: [id number]    Visit Date: 4/17/2019  Visit #: 9 / 12  Authorization period Expiration: 12/31/19    Plan of Care Expiration: 06/08/19  Precautions: standard    Time In: 9:00  Time Out: 9:34  Total 1:1 Treatment Time: 30min    Treatment Diagnosis: Low back pain  Physician: Florentin Gutierrez PA    Subjective   Pt reports: have to cut treatment short today because we are getting inspections done on a house we are buying today, no pain though I feel good      Pain Scale:  0/10 on VAS currently, 0/10 on VAS post treatment  Pain Location: no c/o pain today    Objective   JACKSON received therapeutic exercises (40 min), therapeutic activities (10 min)  to develop strength, endurance, ROM, flexibility, posture and core stabilization including: * = performed    Nustep Lv 3 x 15 minutes  Pelvic tilt x 20*  March with abdominal bracing x 10 ea *  Supine LTR with SB 2 x 10 *  HS stretch in long sitting 1 min ea *  Bridging with ball 2 x 10 *  Supine clams with GTB 2 x 10 *  Standing bilateral rows with BTB 2 x 10  Standing bilateral shoulder ext with BTB 2 x 10  Seated on chair resisted trunk rotation: 3 plates 2x15  Mat squats: 2 x 15 *  Step ups: fwd/lat x 15 ea 6" step *  Prone hip ext: 2 x 10 ea *  hooklying crunches: 2x15 *  Chop and lift in standing: x15 ea/ Blue TB DNP    Jackson received the following manual therapy techniques for 10 minutes including:  Lumbar rotational mobs in s/l bilaterally gr II DNP      Home Exercises and Education Provided   Home Exercises and Patient Education Provided  Education provided re: postural care/correction, frequent changes in position throughout the day, resting positions to decrease pain  - importance of HEP in carry over between treatment sessions and for best rehab outcome  - educated on condition and expectations for therapy  - progress towards goals   - role of therapy in multi - " disciplinary team, goals for therapy  No spiritual or educational barriers to learning provided     Home exercises:   Pt will be provided HEP during course of treatment with progressions as appropriate. Pt was advised to perform these exercises free of pain, and to stop performing them if pain occurs.   Joao demonstrated good  understanding of the education provided.      Assessment   JACKSON was able to complete core strengthening/stabilization/mobility therex today, continues to present with hamstring and hip flexor tightness. Treatment time limited today. No complications or pain reported with treatment session, continue to progress as tolerated. Pain free upon departure.       Pt prognosis is Good. Pt will continue to benefit from skilled outpatient physical therapy to address the deficits listed in the problem list chart on initial evaluation, provide pt/family education and to maximize pt's level of independence in the home and community environment.     Medical necessity is demonstrated by the impairments and functional limitations listed on the Initial Evaluation.     Anticipated barriers to physical therapy: none identified  Pt's spiritual, cultural and educational needs considered and pt agreeable to plan of care and goals.    Goals   Long Term Goals: 6 weeks     1. Decrease pain to < / = 1/10 with activity to allow for independent ADL's and resumption of recreational activities.  2. Improve strength in core and LE muscles to 5/5 for improved lumbopelvic stability.  3. Improve lumbar and bilateral hip/knee ROM to to demonstrate improved posture and flexibility.   4. Improve score on LEFS to < / = 10% impairment / Mod Oswestry to < / = 4% impairment to demonstrate improved function and decrease disability.  5. Increase walking distance and/or duration to 1 mile without pain.   6. Increase standing duration to 25 minutes without pain.   7. Perform sit to stand transfers without increased pain or limitation.  8.  Pt to demonstrate independence with home exercise program to maintain gains made in therapy.      Plan   Continue with established Plan of Care towards Physical Therapy goals.   Discussed Plan of Care with patient: Yes    Stan Kohli, PTA

## 2019-04-22 ENCOUNTER — CLINICAL SUPPORT (OUTPATIENT)
Dept: REHABILITATION | Facility: HOSPITAL | Age: 79
End: 2019-04-22
Payer: MEDICARE

## 2019-04-22 DIAGNOSIS — M62.81 MUSCLE WEAKNESS (GENERALIZED): ICD-10-CM

## 2019-04-22 PROCEDURE — 97530 THERAPEUTIC ACTIVITIES: CPT

## 2019-04-22 PROCEDURE — 97110 THERAPEUTIC EXERCISES: CPT

## 2019-04-22 NOTE — PROGRESS NOTES
"                            Physical Therapy Daily Treatment Note   Name: Joao Ardon 1940  MRN: [id number]    Visit Date: 4/22/2019  Visit #: 10 / 12  Authorization period Expiration: 12/31/19    Plan of Care Expiration: 06/08/19  Precautions: standard    Time In: 9:00  Time Out: 10:05  Total 1:1 Treatment Time: 60min    Treatment Diagnosis: Low back pain  Physician: Florentin Gutierrez PA    Subjective   Pt reports: i'm falling apart. No pain just want to get balance better. Only got one more visit here until I go back to [place].      Pain Scale:  0/10 on VAS currently, 0/10 on VAS post treatment  Pain Location: no c/o pain today    Objective   JACKSON received therapeutic exercises (45 min), therapeutic activities (10 min)  to develop strength, endurance, ROM, flexibility, posture and core stabilization including: * = performed    Rec Bike Lv 3 x 15 minutes *  Pelvic tilt x 20*  March with abdominal bracing x 10 ea *  Supine LTR with SB 2 x 10 *  HS stretch in long sitting 1 min ea *  Bridging with ball 2 x 10 *  Supine clams with GTB 2 x 10 *  Standing bilateral rows with BTB 2 x 10 *  Standing bilateral shoulder ext with BTB 2 x 10 *  Seated on chair resisted trunk rotation: 3 plates 2x15 *  Mat squats: 2 x 15 *  Step ups: fwd/lat x 15 ea 6" step *  Prone hip ext: 2 x 10 ea *  hooklying crunches: 2x15 *  Chop and lift in standing: x15 ea/ Blue TB DNP    Jackson received the following manual therapy techniques for 10 minutes including:  Lumbar rotational mobs in s/l bilaterally gr II DNP      Home Exercises and Education Provided   Home Exercises and Patient Education Provided  Education provided re: postural care/correction, frequent changes in position throughout the day, resting positions to decrease pain  - importance of HEP in carry over between treatment sessions and for best rehab outcome  - educated on condition and expectations for therapy  - progress towards goals   - role of therapy in multi - disciplinary " team, goals for therapy  No spiritual or educational barriers to learning provided     Home exercises:   Pt will be provided HEP during course of treatment with progressions as appropriate. Pt was advised to perform these exercises free of pain, and to stop performing them if pain occurs.   Joao demonstrated good  understanding of the education provided.      Assessment   JACKSON was able to complete core strengthening/stabilization/mobility therex today, continues to present with hamstring and hip flexor tightness. Treatment time limited today.  No complications or pain reported with treatment session, continue to progress as tolerated. During supine exercises patient had tendency to allow trunk lean to left suggesting weakness on that side and required VC's to promote symmetry. Pain free upon departure.       Pt prognosis is Good. Pt will continue to benefit from skilled outpatient physical therapy to address the deficits listed in the problem list chart on initial evaluation, provide pt/family education and to maximize pt's level of independence in the home and community environment.     Medical necessity is demonstrated by the impairments and functional limitations listed on the Initial Evaluation.     Anticipated barriers to physical therapy: none identified  Pt's spiritual, cultural and educational needs considered and pt agreeable to plan of care and goals.    Goals   Long Term Goals: 6 weeks     1. Decrease pain to < / = 1/10 with activity to allow for independent ADL's and resumption of recreational activities.  2. Improve strength in core and LE muscles to 5/5 for improved lumbopelvic stability.  3. Improve lumbar and bilateral hip/knee ROM to to demonstrate improved posture and flexibility.   4. Improve score on LEFS to < / = 10% impairment / Mod Oswestry to < / = 4% impairment to demonstrate improved function and decrease disability.  5. Increase walking distance and/or duration to 1 mile without pain.   6.  Increase standing duration to 25 minutes without pain.   7. Perform sit to stand transfers without increased pain or limitation.  8. Pt to demonstrate independence with home exercise program to maintain gains made in therapy.      Plan   Continue with established Plan of Care towards Physical Therapy goals.   Discussed Plan of Care with patient: Yes    Stan Kohli, PTA

## 2019-04-23 ENCOUNTER — LAB VISIT (OUTPATIENT)
Dept: LAB | Facility: HOSPITAL | Age: 79
End: 2019-04-23
Attending: INTERNAL MEDICINE
Payer: MEDICARE

## 2019-04-23 DIAGNOSIS — I48.20 CHRONIC ATRIAL FIBRILLATION: Primary | ICD-10-CM

## 2019-04-23 LAB
INR PPP: 2.5 (ref 0.8–1.2)
PROTHROMBIN TIME: 28.5 SEC (ref 9–12.5)

## 2019-04-23 PROCEDURE — 36415 COLL VENOUS BLD VENIPUNCTURE: CPT

## 2019-04-23 PROCEDURE — 85610 PROTHROMBIN TIME: CPT

## 2019-04-24 ENCOUNTER — CLINICAL SUPPORT (OUTPATIENT)
Dept: REHABILITATION | Facility: HOSPITAL | Age: 79
End: 2019-04-24
Payer: MEDICARE

## 2019-04-24 DIAGNOSIS — M62.81 MUSCLE WEAKNESS (GENERALIZED): ICD-10-CM

## 2019-04-24 PROCEDURE — 97110 THERAPEUTIC EXERCISES: CPT

## 2019-04-24 PROCEDURE — 97530 THERAPEUTIC ACTIVITIES: CPT

## 2019-04-24 NOTE — PROGRESS NOTES
"                            Physical Therapy Daily Treatment Note   Name: Joao Ardon 1940  MRN: [id number]    Visit Date: 4/24/2019  Visit #: 11 / 12  Authorization period Expiration: 12/31/19    Plan of Care Expiration: 06/08/19  Precautions: standard    Time In: 9:00  Time Out: 10:05  Total 1:1 Treatment Time: 60min    Treatment Diagnosis: Low back pain  Physician: Florentin Gutierrez PA    Subjective   Pt reports: really enjoyed therapy and feel as if it has helped me a lot. Going back to [place] in 2 weeks.       Pain Scale:  0/10 on VAS currently, 0/10 on VAS post treatment  Pain Location: no c/o pain today    Objective   JACKSON received therapeutic exercises (45 min), therapeutic activities (10 min)  to develop strength, endurance, ROM, flexibility, posture and core stabilization including: * = performed    Rec Bike Lv 3 x 15 minutes *  Pelvic tilt x 20*  March with abdominal bracing x 10 ea *  Supine LTR with SB 2 x 10 *  HS stretch in long sitting 1 min ea *  Bridging with ball 2 x 10 *  Supine clams with GTB 2 x 10 *  Standing bilateral rows with BTB 2 x 10 *  Standing bilateral shoulder ext with BTB 2 x 10 *  Seated on chair resisted trunk rotation: 3 plates 2x15 *  Mat squats: 2 x 15 *  Step ups: fwd/lat x 15 ea 6" step *  Prone hip ext: 2 x 10 ea *  hooklying crunches: 2x15 *  Chop and lift in standing: x15 ea/ Blue TB DNP    Jackson received the following manual therapy techniques for 10 minutes including:  Lumbar rotational mobs in s/l bilaterally gr II DNP      Home Exercises and Education Provided   Home Exercises and Patient Education Provided  Education provided re: postural care/correction, frequent changes in position throughout the day, resting positions to decrease pain  - importance of HEP in carry over between treatment sessions and for best rehab outcome  - educated on condition and expectations for therapy  - progress towards goals   - role of therapy in multi - disciplinary team, goals for " therapy  No spiritual or educational barriers to learning provided     Home exercises:   Pt will be provided HEP during course of treatment with progressions as appropriate. Pt was advised to perform these exercises free of pain, and to stop performing them if pain occurs.   Joao demonstrated good  understanding of the education provided.      Assessment   JACKSON was able to complete core strengthening/stabilization/mobility therex today. Patient to be discharged by supervising PT. Patient subjectively states he feels as if he has met his goals however needs to continue with independent exercises which were verbally reviewed and demonstrated by patient. Discussed joining health club to continue exercises in [place]. No complications or pain reported.     Anticipated barriers to physical therapy: none identified  Pt's spiritual, cultural and educational needs considered and pt agreeable to plan of care and goals.    Goals   Long Term Goals: 6 weeks     1. Decrease pain to < / = 1/10 with activity to allow for independent ADL's and resumption of recreational activities. met  2. Improve strength in core and LE muscles to 5/5 for improved lumbopelvic stability.  3. Improve lumbar and bilateral hip/knee ROM to to demonstrate improved posture and flexibility.   4. Improve score on LEFS to < / = 10% impairment / Mod Oswestry to < / = 4% impairment to demonstrate improved function and decrease disability.  5. Increase walking distance and/or duration to 1 mile without pain. met  6. Increase standing duration to 25 minutes without pain. met  7. Perform sit to stand transfers without increased pain or limitation. met  8. Pt to demonstrate independence with home exercise program to maintain gains made in therapy.  met    Plan   To be DC'd by supervising PT  Discussed Plan of Care with patient: Yes    Stan Kohli, PTA
